# Patient Record
Sex: MALE | Race: WHITE | NOT HISPANIC OR LATINO | Employment: FULL TIME | URBAN - METROPOLITAN AREA
[De-identification: names, ages, dates, MRNs, and addresses within clinical notes are randomized per-mention and may not be internally consistent; named-entity substitution may affect disease eponyms.]

---

## 2021-08-18 ENCOUNTER — HOSPITAL ENCOUNTER (EMERGENCY)
Facility: HOSPITAL | Age: 26
Discharge: HOME | End: 2021-08-18
Attending: EMERGENCY MEDICINE | Admitting: EMERGENCY MEDICINE

## 2021-08-18 VITALS
SYSTOLIC BLOOD PRESSURE: 132 MMHG | WEIGHT: 209.22 LBS | HEIGHT: 71 IN | OXYGEN SATURATION: 95 % | TEMPERATURE: 98 F | BODY MASS INDEX: 29.29 KG/M2 | HEART RATE: 85 BPM | DIASTOLIC BLOOD PRESSURE: 86 MMHG | RESPIRATION RATE: 18 BRPM

## 2021-08-18 DIAGNOSIS — S30.0XXS LUMBAR CONTUSION, SEQUELA: Primary | ICD-10-CM

## 2021-08-18 DIAGNOSIS — R60.9 SOFT TISSUE SWELLING OF BACK: ICD-10-CM

## 2021-08-18 PROCEDURE — 99282 EMERGENCY DEPT VISIT SF MDM: CPT

## 2021-08-18 RX ORDER — SENNA PLUS 8.6 MG/1
1 TABLET ORAL DAILY
COMMUNITY

## 2021-08-18 RX ORDER — CYCLOBENZAPRINE HCL 10 MG
10 TABLET ORAL 3 TIMES DAILY PRN
COMMUNITY

## 2021-08-18 RX ORDER — TRAMADOL HYDROCHLORIDE 50 MG/1
50 TABLET ORAL EVERY 6 HOURS PRN
COMMUNITY

## 2021-08-18 RX ORDER — MELOXICAM 7.5 MG/1
7.5 TABLET ORAL DAILY
COMMUNITY
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
## 2021-08-18 NOTE — DISCHARGE INSTRUCTIONS
Cold compresses to your low back area.  Apply for 20 to 30 minutes 3-5 times per day.  Do not apply ice directly to the skin.  Continue your home medications as prescribed.  Follow-up with your family doctor in 1 to 2 days.  Activity as tolerated.  Return to the ER for any change or worsening of your symptoms.